# Patient Record
Sex: MALE | Race: WHITE
[De-identification: names, ages, dates, MRNs, and addresses within clinical notes are randomized per-mention and may not be internally consistent; named-entity substitution may affect disease eponyms.]

---

## 2018-01-09 ENCOUNTER — HOSPITAL ENCOUNTER (EMERGENCY)
Dept: HOSPITAL 35 - ER | Age: 68
Discharge: HOME | End: 2018-01-09
Payer: COMMERCIAL

## 2018-01-09 VITALS — WEIGHT: 210.01 LBS | HEIGHT: 63 IN | BODY MASS INDEX: 37.21 KG/M2

## 2018-01-09 DIAGNOSIS — E86.0: Primary | ICD-10-CM

## 2018-01-09 DIAGNOSIS — R53.1: ICD-10-CM

## 2018-01-09 LAB
ANION GAP SERPL CALC-SCNC: 13 MMOL/L (ref 7–16)
BASOPHILS NFR BLD AUTO: 0.6 % (ref 0–2)
BILIRUB UR-MCNC: NEGATIVE MG/DL
BUN SERPL-MCNC: 12 MG/DL (ref 7–18)
CALCIUM SERPL-MCNC: 8.4 MG/DL (ref 8.5–10.1)
CHLORIDE SERPL-SCNC: 95 MMOL/L (ref 98–107)
CO2 SERPL-SCNC: 21 MMOL/L (ref 21–32)
COLOR UR: YELLOW
CREAT SERPL-MCNC: 0.7 MG/DL (ref 0.7–1.3)
EOSINOPHIL NFR BLD: 0.8 % (ref 0–3)
ERYTHROCYTE [DISTWIDTH] IN BLOOD BY AUTOMATED COUNT: 14.6 % (ref 10.5–14.5)
GLUCOSE SERPL-MCNC: 93 MG/DL (ref 74–106)
GRANULOCYTES NFR BLD MANUAL: 72.4 % (ref 36–66)
HCT VFR BLD CALC: 43.5 % (ref 42–52)
HGB BLD-MCNC: 14.8 GM/DL (ref 14–18)
KETONES UR STRIP-MCNC: NEGATIVE MG/DL
LYMPHOCYTES NFR BLD AUTO: 20.6 % (ref 24–44)
MCH RBC QN AUTO: 31.6 PG (ref 26–34)
MCHC RBC AUTO-ENTMCNC: 33.9 G/DL (ref 28–37)
MCV RBC: 93.1 FL (ref 80–100)
MONOCYTES NFR BLD: 5.6 % (ref 1–8)
NEUTROPHILS # BLD: 4.8 THOU/UL (ref 1.4–8.2)
PLATELET # BLD: 267 THOU/UL (ref 150–400)
POTASSIUM SERPL-SCNC: 3.9 MMOL/L (ref 3.5–5.1)
RBC # BLD AUTO: 4.67 MIL/UL (ref 4.5–6)
RBC # UR STRIP: NEGATIVE /UL
SODIUM SERPL-SCNC: 129 MMOL/L (ref 136–145)
SP GR UR STRIP: <= 1.005 (ref 1–1.03)
TROPONIN I SERPL-MCNC: < 0.04 NG/ML (ref ?–0.06)
URINE CLARITY: CLEAR
URINE GLUCOSE-RANDOM*: NEGATIVE
URINE LEUKOCYTES-REFLEX: NEGATIVE
URINE NITRITE-REFLEX: NEGATIVE
URINE PROTEIN (DIPSTICK): NEGATIVE
UROBILINOGEN UR STRIP-ACNC: 0.2 E.U./DL (ref 0.2–1)
WBC # BLD AUTO: 6.7 THOU/UL (ref 4–11)

## 2019-12-11 ENCOUNTER — HOSPITAL ENCOUNTER (INPATIENT)
Dept: HOSPITAL 35 - ER | Age: 69
LOS: 2 days | Discharge: HOME | DRG: 310 | End: 2019-12-13
Attending: HOSPITALIST | Admitting: HOSPITALIST
Payer: COMMERCIAL

## 2019-12-11 VITALS — BODY MASS INDEX: 31.71 KG/M2 | HEIGHT: 62.99 IN | WEIGHT: 179 LBS

## 2019-12-11 VITALS — SYSTOLIC BLOOD PRESSURE: 122 MMHG | DIASTOLIC BLOOD PRESSURE: 56 MMHG

## 2019-12-11 VITALS — SYSTOLIC BLOOD PRESSURE: 133 MMHG | DIASTOLIC BLOOD PRESSURE: 80 MMHG

## 2019-12-11 DIAGNOSIS — Z91.19: ICD-10-CM

## 2019-12-11 DIAGNOSIS — M54.9: ICD-10-CM

## 2019-12-11 DIAGNOSIS — I48.0: ICD-10-CM

## 2019-12-11 DIAGNOSIS — I48.19: Primary | ICD-10-CM

## 2019-12-11 DIAGNOSIS — F17.210: ICD-10-CM

## 2019-12-11 DIAGNOSIS — Z88.0: ICD-10-CM

## 2019-12-11 DIAGNOSIS — E78.5: ICD-10-CM

## 2019-12-11 DIAGNOSIS — I10: ICD-10-CM

## 2019-12-11 DIAGNOSIS — M54.30: ICD-10-CM

## 2019-12-11 DIAGNOSIS — Z82.49: ICD-10-CM

## 2019-12-11 DIAGNOSIS — Z86.73: ICD-10-CM

## 2019-12-11 DIAGNOSIS — G89.29: ICD-10-CM

## 2019-12-11 LAB
ANION GAP SERPL CALC-SCNC: 13 MMOL/L (ref 7–16)
APTT BLD: 29.2 SECONDS (ref 24.5–32.8)
BASOPHILS NFR BLD AUTO: 0.5 % (ref 0–2)
BUN SERPL-MCNC: 9 MG/DL (ref 7–18)
CALCIUM SERPL-MCNC: 9.4 MG/DL (ref 8.5–10.1)
CHLORIDE SERPL-SCNC: 96 MMOL/L (ref 98–107)
CO2 SERPL-SCNC: 25 MMOL/L (ref 21–32)
CREAT SERPL-MCNC: 0.9 MG/DL (ref 0.7–1.3)
EOSINOPHIL NFR BLD: 0.2 % (ref 0–3)
ERYTHROCYTE [DISTWIDTH] IN BLOOD BY AUTOMATED COUNT: 14 % (ref 10.5–14.5)
GLUCOSE SERPL-MCNC: 106 MG/DL (ref 74–106)
GRANULOCYTES NFR BLD MANUAL: 83.8 % (ref 36–66)
HCT VFR BLD CALC: 45.6 % (ref 42–52)
HGB BLD-MCNC: 15.6 GM/DL (ref 14–18)
INR PPP: 1.1
LYMPHOCYTES NFR BLD AUTO: 8.5 % (ref 24–44)
MAGNESIUM SERPL-MCNC: 1.9 MG/DL (ref 1.8–2.4)
MCH RBC QN AUTO: 32.5 PG (ref 26–34)
MCHC RBC AUTO-ENTMCNC: 34.3 G/DL (ref 28–37)
MCV RBC: 94.8 FL (ref 80–100)
MONOCYTES NFR BLD: 7 % (ref 1–8)
NEUTROPHILS # BLD: 8.6 THOU/UL (ref 1.4–8.2)
PLATELET # BLD: 346 THOU/UL (ref 150–400)
POTASSIUM SERPL-SCNC: 4.3 MMOL/L (ref 3.5–5.1)
PROTHROMBIN TIME: 11.1 SECONDS (ref 9.3–11.4)
RBC # BLD AUTO: 4.81 MIL/UL (ref 4.5–6)
SODIUM SERPL-SCNC: 134 MMOL/L (ref 136–145)
TROPONIN I SERPL-MCNC: <0.06 NG/ML (ref ?–0.06)
WBC # BLD AUTO: 10.3 THOU/UL (ref 4–11)

## 2019-12-11 PROCEDURE — 10081 I&D PILONIDAL CYST COMP: CPT

## 2019-12-11 NOTE — 2DMMODE
[facility]
[place]  [address]
Phone:  [phone] 2 D/M-MODE ECHOCARDIOGRAM     
_______________________________________________________________________________
 
Name:            FRANCINE GARCIA              Room #:        170-7       ADM IN
..#:           [id number]          Account #:     [id number]  
Admission:       12/11/19         Attend Phys:   Ron Siu MD 
Discharge:                  Date of Birth: [birth year]  
                         Report #:      [id number]
        [id number]
_______________________________________________________________________________
THIS REPORT FOR:   //name//                          
 
 
--------------- APPROVED REPORT --------------
 
 
Study performed:  12/11/2019 13:33:03
 
EXAM: Comprehensive 2D, Doppler, and color-flow 
Echocardiogram 
Patient Location: ER    
Room #:  7     Status:  routine
 
        BSA:         1.99
HR: 99 bpm   BP:          125/57 mmHg 
Rhythm: Atrial Fibrillation     
 
Other Information 
Study Quality: Good
 
Indications
Atrial Fibrillation
Hypertension/HDD
 
2D Dimensions
RVDd:  39.38 mm  
IVSd:  9.20 (7-11mm) LVOT Diam:  23.26 (18-24mm) 
LVDd:  54.03 mm  
PWd:  9.25 (7-11mm) Ascending Ao:  31.21 (22-36mm)
LVDs:  33.18 (25-40mm) 
Aortic Root:  30.58 mm IVC:  13.00 mm
 
Volumes
Left Atrial Volume (Systole) 
Single Plane 4CH:  148.60 mL Single Plane 2CH:  78.55 mL
    LA ESV Index:  58.00 mL/m2
 
Aortic Valve
AoV Peak Brayden.:  1.22 m/s 
AO Peak Gr.:  5.93 mmHg  LVOT Max PG:  3.55 mmHg
    LVOT Max V:  0.94 m/s
MIKE Vmax: 3.29 cm2  
 
Pulmonary Valve
PV Peak Brayden.:  1.02 m/s PV Peak Gr.:  4.22 mmHg
 
 
 
[facility]
[address]
[place]  [address]
Phone:  [phone]                    2 D/M-MODE ECHOCARDIOGRAM     
_______________________________________________________________________________
 
Name:            FRANCINE GARCIA              Room #:        170       ADM IN
[facility]#:           [id number]          Account #:     [id number]  
Admission:       12/11/19         Attend Phys:   Ron Siu MD 
Discharge:                  Date of Birth: [birth year]  
                         Report #:      [id number]
        [id number]
_______________________________________________________________________________
Tricuspid Valve
TR Peak Brayden.:  2.40 m/s  
TR Peak Gr.:  23.12 mmHg 
    PA Pressure:  28.00 mmHg
 
Left Ventricle
The left ventricle is normal size. There is normal LV segmental wall 
motion. There is normal left ventricular wall thickness. The left 
ventricular systolic function is normal. The left ventricular 
ejection fraction is within the normal range. LVEF is 60-65%. This 
study is not technically sufficient to allow evaluation of the LV 
diastolic function due to atrial fibrillation.
 
Right Ventricle
The right ventricle is normal size. The right ventricular systolic 
function is normal.
 
Atria
Left atrium is dilated. Right atrium is dilated.
 
Aortic Valve
The aortic valve is normal in structure. No aortic regurgitation is 
present. There is no aortic valvular stenosis.
 
Mitral Valve
The mitral valve is normal in structure. Mild mitral regurgitation. 
No evidence of mitral valve stenosis.
 
Tricuspid Valve
The tricuspid valve is normal in structure. There is mild tricuspid 
regurgitation. Estimated PAP 28 mmHg. There is no pulmonary 
hypertension.
 
Pulmonic Valve
The pulmonary valve is normal in structure. There is no pulmonic 
valvular regurgitation.
 
Great Vessels
The aortic root is normal in size. IVC is normal in size and 
collapses >50% with inspiration.
 
Pericardium
There is no pericardial effusion.
 
<Conclusion>
The left ventricle is normal size.
 
 
[facility]
[address]
[place]  [address]
Phone:  [phone]                    2 D/M-MODE ECHOCARDIOGRAM     
_______________________________________________________________________________
 
Name:            FRANCINE GARCIA GONZALEZ              Room #:        170-7       ADM IN
M.R.#:           [id number]          Account #:     [id number]  
Admission:       12/11/19         Attend Phys:   Ron Siu MD 
Discharge:                  Date of Birth: [birth year]  
                         Report #:      [id number]
        [id number]
_______________________________________________________________________________
LVEF is 60-65%.
Left atrium is dilated.
Right atrium is dilated.
The aortic valve is normal in structure.
The mitral valve is normal in structure.
Mild mitral regurgitation.
The tricuspid valve is normal in structure.
There is mild tricuspid regurgitation. Estimated PAP 28 mmHg.
There is no pulmonary hypertension.
The pulmonary valve is normal in structure.
There is no pericardial effusion.
 
 
 
 
 
 
 
 
 
 
 
 
 
 
 
 
 
 
 
 
 
 
 
 
 
 
 
 
 
 
 
 
 
  <ELECTRONICALLY SIGNED>
   By: Julian Zamora MD    
  12/11/19     1541
D: 12/11/19 1541                           _____________________________________
T: 12/11/19 1541                           Julian Zamora MD      /INF

## 2019-12-11 NOTE — NUR
PT IS ALERT AND ORIENTED X4. LUNGS ARE CLEAR AFIB ON THE MONITOR PT ADMIT FROM
EMERGECNCY ROOM. DR. ANDERSON SEEN PT CARDIOLOGY THIS EVENING. WILL START ON
CARDIZEM DRIP. ABDOMEN IS ROUND AND BOWEL SOUNDS ACTIVE. PT REPORTS HE DRINKS
3-4 BEERS EVERY DAY. NEVER FEELING THE NEED TO CUT DOWN ON HIS DRINKING. FALL
BRACELET ON PT. YELLOW SOCKS ON . REPORTS HAVING FALLS AT HOME AND WEAKNESS
INSTRUCTED NOT TO GET UP UNLESS ASSISTANCE

## 2019-12-11 NOTE — EKG
[address] [facility]
[place]  [address]
Phone:  [phone]                    ELECTROCARDIOGRAM REPORT      
_______________________________________________________________________________
 
Name:       FRANCINE GARCIA              Room #:                     [facility]#:      [id number]     Account #:      [id number]  
Admission:  19    Attend Phys:                          
Discharge:              Date of Birth:  [birth year]  
                                                          Report #: [id number]
   [id number]
_______________________________________________________________________________
THIS REPORT FOR:   //name//                          
 
                         [facility] ED
                                       
Test Date:    2019               Test Time:    10:55:12
Pat Name:     FRANCINE GARCIA                 Department:   
Patient ID:   [id number]            Room:          
Gender:                               [facility]
:          1950               Requested By: Moon Hutchins
Order Number: [id number] MD:   Bert Younger
                                 Measurements
Intervals                              Axis          
Rate:         120                      P:            
WA:                                    QRS:          22
QRSD:         86                       T:            -8
QT:           321                                    
QTc:          454                                    
                           Interpretive Statements
Atrial fibrillation
Borderline T abnormalities, inferior leads
Compared to ECG 2018 18:04:34
T-wave abnormality now present
 
Electronically Signed On 2019 12:34:09 CST by Bert Younger
[email or web address]
 
 
 
 
 
 
 
 
 
 
 
 
 
 
 
 
 
 
  <ELECTRONICALLY SIGNED>
   By: Bert Younger MD        
  19     1234
D: 19 1055                           _____________________________________
T: 19                           Bert Younger MD          /MICHOACANO

## 2019-12-12 VITALS — SYSTOLIC BLOOD PRESSURE: 143 MMHG | DIASTOLIC BLOOD PRESSURE: 79 MMHG

## 2019-12-12 VITALS — SYSTOLIC BLOOD PRESSURE: 121 MMHG | DIASTOLIC BLOOD PRESSURE: 56 MMHG

## 2019-12-12 VITALS — DIASTOLIC BLOOD PRESSURE: 41 MMHG | SYSTOLIC BLOOD PRESSURE: 107 MMHG

## 2019-12-12 VITALS — DIASTOLIC BLOOD PRESSURE: 47 MMHG | SYSTOLIC BLOOD PRESSURE: 96 MMHG

## 2019-12-12 VITALS — DIASTOLIC BLOOD PRESSURE: 71 MMHG | SYSTOLIC BLOOD PRESSURE: 136 MMHG

## 2019-12-12 VITALS — SYSTOLIC BLOOD PRESSURE: 141 MMHG | DIASTOLIC BLOOD PRESSURE: 64 MMHG

## 2019-12-12 LAB
ANION GAP SERPL CALC-SCNC: 13 MMOL/L (ref 7–16)
BUN SERPL-MCNC: 9 MG/DL (ref 7–18)
CALCIUM SERPL-MCNC: 9 MG/DL (ref 8.5–10.1)
CHLORIDE SERPL-SCNC: 98 MMOL/L (ref 98–107)
CHOLEST SERPL-MCNC: 166 MG/DL (ref ?–200)
CO2 SERPL-SCNC: 25 MMOL/L (ref 21–32)
CREAT SERPL-MCNC: 0.7 MG/DL (ref 0.7–1.3)
ERYTHROCYTE [DISTWIDTH] IN BLOOD BY AUTOMATED COUNT: 14.2 % (ref 10.5–14.5)
GLUCOSE SERPL-MCNC: 73 MG/DL (ref 74–106)
HCT VFR BLD CALC: 44.2 % (ref 42–52)
HDLC SERPL-MCNC: 29 MG/DL (ref 40–?)
HGB BLD-MCNC: 14.6 GM/DL (ref 14–18)
LDLC SERPL-MCNC: 113 MG/DL (ref ?–100)
MCH RBC QN AUTO: 32.2 PG (ref 26–34)
MCHC RBC AUTO-ENTMCNC: 33.1 G/DL (ref 28–37)
MCV RBC: 97.5 FL (ref 80–100)
PLATELET # BLD: 324 THOU/UL (ref 150–400)
POTASSIUM SERPL-SCNC: 3.8 MMOL/L (ref 3.5–5.1)
RBC # BLD AUTO: 4.53 MIL/UL (ref 4.5–6)
SODIUM SERPL-SCNC: 136 MMOL/L (ref 136–145)
TC:HDL: 5.7 RATIO
TRIGL SERPL-MCNC: 124 MG/DL (ref ?–150)
VLDLC SERPL CALC-MCNC: 25 MG/DL (ref ?–40)
WBC # BLD AUTO: 9.1 THOU/UL (ref 4–11)

## 2019-12-12 NOTE — NUR
ASSUMED PT CARE [id number]. PT IS ALERT AND ORIENTED WITH NO SIGN OF DISTRESS
NOTED IN PT. FALL PRECAUTION IN PLACE. ASSESSMENT COMPLETED AND DOCUMENTED.
SCHEDULED MEDS ADMINISTERED TO PT. DENIES ANY PAIN. PT IS STABLE THROUGHOUT
THE NIGHT. DENIES ANY FURTHER NEEDS AT THIS TIME.

## 2019-12-12 NOTE — NUR
ASSUMED CARE 0700, ALERT X4, SCIATICA BACK PAIN MANAGED WITH MEDS AND PHYICAL
THERAPY EXERCISES. UP TO BEDSIDE COMMODE WITH SMALL BM TODAY. ADMISSION STATUS
CHANGED TO IMPATIENT. CONTROLLED AFIB ON TELE. CALL LIGHT IN REACH.

## 2019-12-13 VITALS — DIASTOLIC BLOOD PRESSURE: 75 MMHG | SYSTOLIC BLOOD PRESSURE: 132 MMHG

## 2019-12-13 VITALS — DIASTOLIC BLOOD PRESSURE: 40 MMHG | SYSTOLIC BLOOD PRESSURE: 129 MMHG

## 2019-12-13 VITALS — DIASTOLIC BLOOD PRESSURE: 58 MMHG | SYSTOLIC BLOOD PRESSURE: 150 MMHG

## 2019-12-13 LAB
ANION GAP SERPL CALC-SCNC: 8 MMOL/L (ref 7–16)
BUN SERPL-MCNC: 14 MG/DL (ref 7–18)
CALCIUM SERPL-MCNC: 8.4 MG/DL (ref 8.5–10.1)
CHLORIDE SERPL-SCNC: 99 MMOL/L (ref 98–107)
CO2 SERPL-SCNC: 27 MMOL/L (ref 21–32)
CREAT SERPL-MCNC: 0.6 MG/DL (ref 0.7–1.3)
ERYTHROCYTE [DISTWIDTH] IN BLOOD BY AUTOMATED COUNT: 14 % (ref 10.5–14.5)
GLUCOSE SERPL-MCNC: 99 MG/DL (ref 74–106)
HCT VFR BLD CALC: 42 % (ref 42–52)
HGB BLD-MCNC: 13.8 GM/DL (ref 14–18)
INR PPP: 1.2
MCH RBC QN AUTO: 32.2 PG (ref 26–34)
MCHC RBC AUTO-ENTMCNC: 33 G/DL (ref 28–37)
MCV RBC: 97.8 FL (ref 80–100)
PLATELET # BLD: 358 THOU/UL (ref 150–400)
POTASSIUM SERPL-SCNC: 3.8 MMOL/L (ref 3.5–5.1)
PROTHROMBIN TIME: 12.8 SECONDS (ref 9.3–11.4)
RBC # BLD AUTO: 4.29 MIL/UL (ref 4.5–6)
SODIUM SERPL-SCNC: 134 MMOL/L (ref 136–145)
WBC # BLD AUTO: 8.3 THOU/UL (ref 4–11)

## 2019-12-13 NOTE — NUR
Pt dcing home today per the care team. He was seen by therapy d/t back pain
and mobility issues. They have recommended that the pt use a rwalker at home.
Writer visited with him at bedside and he reports that he is normally indep
with gait, adl's, and driving. He has two sets of steps with landing up to
this apt door.  He declined us ordering a rwalker for him and indicates that
his nebor has one he can use. The pt lives alone and has support per
friends/neighbors. His pcp is Dr Crocker. He may be interested in outpt
therapy. He will f/u with his pcp at [place]. He denies any concerns about caring
for himself at home. Encouragement given to f/u with his pcp within a week.
He reports his friend can transport him home today.

## 2019-12-13 NOTE — NUR
ASSUMED PT CARE AT 1900. PT IS ALERT AND ORIENTED WITH NO SIGN OF DISTRESS
NOTED. FALL PRECAUTION IN PLACE. ASSESSMENT COMPLETED AND DOCUMENTED. PT IS
STABLE. DENIES ANY PAIN. SCHEDULED MEDS ADMINISTERED TO PT. PT TOLERATED PO
INTAKE. DENIES ANY FURTHER NEEDS AT THIS TIME.

## 2019-12-13 NOTE — NUR
ASSUMED CARE 0700. VSS, ALERT X4, PAIN MANAGED WITH MEDICATIONS, THERAPY
ROUNDED WITH FOLLOW UP ON SCIATIC EXERCISES AND WALKER USE. NEW ORDER FOR
TRAMADOL FOR PAIN. PT TO DISCHARGE HOME WITH SELF CARE. REVIEWED DC PAPERS, IV
REMOVED, CONTROLED AFIB AND MONITOR NOW REMOVED FOR DISCHARGE.

## 2020-06-30 ENCOUNTER — HOSPITAL ENCOUNTER (OUTPATIENT)
Dept: HOSPITAL 35 - SJCVC | Age: 70
End: 2020-06-30
Attending: INTERNAL MEDICINE
Payer: COMMERCIAL

## 2020-06-30 DIAGNOSIS — R94.39: ICD-10-CM

## 2020-06-30 DIAGNOSIS — R60.9: ICD-10-CM

## 2020-06-30 DIAGNOSIS — R94.31: Primary | ICD-10-CM

## 2020-06-30 DIAGNOSIS — E78.00: ICD-10-CM

## 2020-06-30 DIAGNOSIS — I10: ICD-10-CM

## 2021-01-04 ENCOUNTER — HOSPITAL ENCOUNTER (OUTPATIENT)
Dept: HOSPITAL 35 - SJCVC | Age: 71
End: 2021-01-04
Attending: INTERNAL MEDICINE
Payer: COMMERCIAL

## 2021-01-04 DIAGNOSIS — Z88.0: ICD-10-CM

## 2021-01-04 DIAGNOSIS — M54.32: ICD-10-CM

## 2021-01-04 DIAGNOSIS — E78.5: ICD-10-CM

## 2021-01-04 DIAGNOSIS — I10: ICD-10-CM

## 2021-01-04 DIAGNOSIS — R94.31: Primary | ICD-10-CM

## 2021-01-04 DIAGNOSIS — I48.19: ICD-10-CM

## 2021-01-04 DIAGNOSIS — Z79.899: ICD-10-CM
